# Patient Record
Sex: FEMALE | Race: WHITE | Employment: PART TIME | URBAN - METROPOLITAN AREA
[De-identification: names, ages, dates, MRNs, and addresses within clinical notes are randomized per-mention and may not be internally consistent; named-entity substitution may affect disease eponyms.]

---
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
## 2022-01-06 ENCOUNTER — OFFICE VISIT (OUTPATIENT)
Dept: PODIATRY | Age: 22
End: 2022-01-06

## 2022-01-06 VITALS — BODY MASS INDEX: 23.49 KG/M2 | TEMPERATURE: 97.7 F | WEIGHT: 150 LBS

## 2022-01-06 DIAGNOSIS — Z09 POSTOPERATIVE EXAMINATION: Primary | ICD-10-CM

## 2022-01-06 PROCEDURE — 99024 POSTOP FOLLOW-UP VISIT: CPT | Performed by: PODIATRIST

## 2022-01-06 NOTE — PROGRESS NOTES
Postop Progress Note    Subjective    Andrea Sharpe presents to the office 4 weeks  following bunion sx . Eating a regular diet without difficulty. Bowel movements are normal. Patient reports wound healing well. The patient is not having any pain. Objective    Vitals:    01/06/22 1200   Temp: 97.7 °F (36.5 °C)     General: alert, cooperative and no distress  Incision: well approximated    Assessment    Doing well postoperatively. Plan   1. Continue any current medications  2. Wound care discussed  3. Wound/Incision: well approximated  4. Disposition:   Pt is to increase activities as tolerated. Limited activities. Should not return to gym class or sports until cleared by a physician. 5. Diet: regular diet  6. Follow up: 2 weeks. Weightbearing in CAM Walker for 2-1/2 weeks and then transition into a tennis shoe. XR FOOT RIGHT (MIN 3 VIEWS)    Result Date: 12/22/2021  EXAMINATION: THREE XRAY VIEWS OF THE RIGHT FOOT 12/22/2021 11:11 am COMPARISON: Right foot x-ray 12/14/2021 HISTORY: ORDERING SYSTEM PROVIDED HISTORY: Post-op pain TECHNOLOGIST PROVIDED HISTORY: Reason for exam:->non wb FINDINGS: Exam was performed without weight-bearing. Osteotomy of the 1st proximal phalanx is unchanged with intact surgical staple. Plate and screw arthrodesis hardware at the 1st tarsometatarsal joint and medial cuneiform joint is unchanged and appears intact without evidence of loosening. There is interval removal of the bandage with evidence of residual soft tissue swelling. Surgical changes appear similar to the previous exam with intact hardware.      XR FOOT RIGHT (MIN 3 VIEWS)    Result Date: 12/14/2021  EXAMINATION: THREE XRAY VIEWS OF THE RIGHT FOOT 12/14/2021 10:26 am COMPARISON: 07/06/2021 HISTORY: ORDERING SYSTEM PROVIDED HISTORY: Post-op pain TECHNOLOGIST PROVIDED HISTORY: Reason for exam:->non wb ARTHRODESIS 12/10/2021 LAPIDUS FUSION FIRST RIGHT AKIN OSTEOTOMY FIRST RIGHT FINDINGS: Chronic hammertoe deformities again suggested at the 4th and 5th toes. Interval surgery and hardware repair of hallux valgus deformity. This includes hallux MTP joint bunionectomy and hallux TMT joint arthrodesis. Hardware appears intact without radiographic evidence of loosening. No radiographic evidence of osseous erosive change to suggest osteomyelitis. No definite acute fracture or dislocation. Interval bunion surgery without acute skeletal pathology. FLUORO FOR SURGICAL PROCEDURES    Result Date: 12/10/2021  EXAMINATION: SPOT FLUOROSCOPIC IMAGES 12/10/2021 7:14 am TECHNIQUE: Fluoroscopy was provided by the radiology department for procedure. Radiologist was not present during examination. FLUOROSCOPY DOSE AND TYPE OR TIME AND EXPOSURES: Fluoroscopy time equals 40.5 seconds. Total dose equals 0.64 mGy COMPARISON: None HISTORY: ORDERING SYSTEM PROVIDED HISTORY: right first fusion TECHNOLOGIST PROVIDED HISTORY: Reason for exam:->right first fusion Intraprocedural imaging. FINDINGS: 15 spot images of the foot were obtained. Intraprocedural fluoroscopic spot images as above. See separate procedure report for more information.      Electronically signed by Jairo Philip DPM on 1/6/2022 at 1:16 PM

## 2022-01-10 ENCOUNTER — TELEPHONE (OUTPATIENT)
Dept: PODIATRY | Age: 22
End: 2022-01-10

## 2022-01-10 NOTE — TELEPHONE ENCOUNTER
Patient called and wanted to know if she was able to return to work. Patient needs a note. Please advise.

## 2022-01-27 ENCOUNTER — OFFICE VISIT (OUTPATIENT)
Dept: PODIATRY | Age: 22
End: 2022-01-27

## 2022-01-27 VITALS — TEMPERATURE: 97.7 F | BODY MASS INDEX: 23.49 KG/M2 | WEIGHT: 150 LBS

## 2022-01-27 DIAGNOSIS — Z09 POSTOPERATIVE EXAMINATION: Primary | ICD-10-CM

## 2022-01-27 PROCEDURE — 99024 POSTOP FOLLOW-UP VISIT: CPT | Performed by: PODIATRIST

## 2022-01-27 NOTE — PROGRESS NOTES
Postop Progress Note    Allison Regalado presents to the office 7 weeks  following bunion sx . Eating a regular diet without difficulty. Bowel movements are normal. Patient reports wound healing well. The patient is not having any pain. Objective    Vitals:    01/27/22 1117   Temp: 97.7 °F (36.5 °C)     General: alert, cooperative and no distress  Incision: well approximated    Assessment    Doing well postoperatively. Plan   1. Continue any current medications  2. Wound care discussed  3. Wound/Incision: well approximated  4. Disposition:   Pt is to increase activities as tolerated. Limited activities. Should not return to gym class or sports until cleared by a physician. 5. Diet: regular diet  6. Follow up: 2 weeks. Tennis shoe  Wt bearing as tolerated      XR FOOT RIGHT (MIN 3 VIEWS)    Result Date: 12/22/2021  EXAMINATION: THREE XRAY VIEWS OF THE RIGHT FOOT 12/22/2021 11:11 am COMPARISON: Right foot x-ray 12/14/2021 HISTORY: ORDERING SYSTEM PROVIDED HISTORY: Post-op pain TECHNOLOGIST PROVIDED HISTORY: Reason for exam:->non wb FINDINGS: Exam was performed without weight-bearing. Osteotomy of the 1st proximal phalanx is unchanged with intact surgical staple. Plate and screw arthrodesis hardware at the 1st tarsometatarsal joint and medial cuneiform joint is unchanged and appears intact without evidence of loosening. There is interval removal of the bandage with evidence of residual soft tissue swelling. Surgical changes appear similar to the previous exam with intact hardware.      XR FOOT RIGHT (MIN 3 VIEWS)    Result Date: 12/14/2021  EXAMINATION: THREE XRAY VIEWS OF THE RIGHT FOOT 12/14/2021 10:26 am COMPARISON: 07/06/2021 HISTORY: ORDERING SYSTEM PROVIDED HISTORY: Post-op pain TECHNOLOGIST PROVIDED HISTORY: Reason for exam:->non wb ARTHRODESIS 12/10/2021 LAPIDUS FUSION FIRST RIGHT AKIN OSTEOTOMY FIRST RIGHT FINDINGS: Chronic hammertoe deformities again suggested at the 4th and 5th toes. Interval surgery and hardware repair of hallux valgus deformity. This includes hallux MTP joint bunionectomy and hallux TMT joint arthrodesis. Hardware appears intact without radiographic evidence of loosening. No radiographic evidence of osseous erosive change to suggest osteomyelitis. No definite acute fracture or dislocation. Interval bunion surgery without acute skeletal pathology. FLUORO FOR SURGICAL PROCEDURES    Result Date: 12/10/2021  EXAMINATION: SPOT FLUOROSCOPIC IMAGES 12/10/2021 7:14 am TECHNIQUE: Fluoroscopy was provided by the radiology department for procedure. Radiologist was not present during examination. FLUOROSCOPY DOSE AND TYPE OR TIME AND EXPOSURES: Fluoroscopy time equals 40.5 seconds. Total dose equals 0.64 mGy COMPARISON: None HISTORY: ORDERING SYSTEM PROVIDED HISTORY: right first fusion TECHNOLOGIST PROVIDED HISTORY: Reason for exam:->right first fusion Intraprocedural imaging. FINDINGS: 15 spot images of the foot were obtained. Intraprocedural fluoroscopic spot images as above. See separate procedure report for more information.      Electronically signed by Lukas Rojo DPM on 1/27/2022 at 12:14 PM

## 2022-03-03 ENCOUNTER — OFFICE VISIT (OUTPATIENT)
Dept: PODIATRY | Age: 22
End: 2022-03-03

## 2022-03-03 VITALS — TEMPERATURE: 98.2 F | BODY MASS INDEX: 23.49 KG/M2 | WEIGHT: 150 LBS

## 2022-03-03 DIAGNOSIS — Z09 POSTOPERATIVE EXAMINATION: Primary | ICD-10-CM

## 2022-03-03 PROCEDURE — 99024 POSTOP FOLLOW-UP VISIT: CPT | Performed by: PODIATRIST

## 2022-03-03 NOTE — PROGRESS NOTES
Postop Progress Note    Subjective    France Parrish presents to the office 3 months  following bunion sx . Eating a regular diet without difficulty. Bowel movements are normal. Patient reports wound healing well. The patient is not having any pain. Objective    Vitals:    03/03/22 1114   Temp: 98.2 °F (36.8 °C)     General: alert, cooperative and no distress  Incision: well approximated    Assessment    Doing well postoperatively. Plan   1. Continue any current medications  2. Wound care discussed  3. Wound/Incision: healing well  4. Disposition:   Pt is to increase activities as tolerated. Normal activities with no limitations. May return to full duty immediately with no restrictions. 5. Diet: regular diet  6. Follow up: 8 weeks. No results found.           Electronically signed by Eneida De La Rosa DPM on 3/3/2022 at 11:57 AM

## 2022-06-02 ENCOUNTER — OFFICE VISIT (OUTPATIENT)
Dept: PODIATRY | Age: 22
End: 2022-06-02
Payer: COMMERCIAL

## 2022-06-02 VITALS — WEIGHT: 150 LBS | BODY MASS INDEX: 23.49 KG/M2 | TEMPERATURE: 98.2 F

## 2022-06-02 DIAGNOSIS — Z09 POSTOPERATIVE EXAMINATION: Primary | ICD-10-CM

## 2022-06-02 PROCEDURE — 99212 OFFICE O/P EST SF 10 MIN: CPT | Performed by: PODIATRIST

## 2022-06-02 NOTE — PROGRESS NOTES
Postop Progress Note    Subjective    Raven Andrea presents to the office  months  following bunion sx . Eating a regular diet without difficulty. Bowel movements are normal. Patient reports wound healing well. The patient is not having any pain. Objective    Vitals:    06/02/22 1104   Temp: 98.2 °F (36.8 °C)     General: alert, cooperative and no distress  Incision: well approximated    Assessment    Doing well postoperatively. Plan   1. Continue any current medications  2. Wound care discussed  3. Wound/Incision: healed   4. Disposition:   Pt is to increase activities as tolerated. Normal activities with no limitations. May return to full duty immediately with no restrictions. 5. Diet: regular diet  6. Follow up:    No results found.           Electronically signed by Kimberly Krishnan DPM on 6/2/2022 at 11:40 AM

## 2022-12-01 ENCOUNTER — OFFICE VISIT (OUTPATIENT)
Dept: PODIATRY | Age: 22
End: 2022-12-01

## 2022-12-01 VITALS — WEIGHT: 150 LBS | BODY MASS INDEX: 23.49 KG/M2 | TEMPERATURE: 97.4 F

## 2022-12-01 DIAGNOSIS — Z09 POSTOPERATIVE EXAMINATION: Primary | ICD-10-CM

## 2022-12-01 NOTE — PROGRESS NOTES
Postop Progress Note    Subjective    Nishi Bussing presents to the office  1 year   following bunion sx . Eating a regular diet without difficulty. Bowel movements are normal. Patient reports wound healing well. The patient is not having any pain. Objective    Vitals:    12/01/22 0811   Temp: 97.4 °F (36.3 °C)     General: alert, cooperative and no distress  Incision: well approximated    Assessment    Doing well postoperatively. Plan   1. Continue any current medications  2. Wound care discussed  3. Wound/Incision: healed   4. Disposition: excellent   Pt is to increase activities as tolerated. Normal activities with no limitations. May return to full duty immediately with no restrictions. 5. Diet: regular diet  6.  Follow up: as needed              Electronically signed by Char Tuttle DPM on 12/1/2022 at 8:19 AM

## (undated) DEVICE — NEEDLE HYPO 22GA L1.5IN BLK POLYPR HUB S STL REG BVL STR

## (undated) DEVICE — SYSTEM TPS ORTHO

## (undated) DEVICE — INTENDED FOR TISSUE SEPARATION, AND OTHER PROCEDURES THAT REQUIRE A SHARP SURGICAL BLADE TO PUNCTURE OR CUT.: Brand: BARD-PARKER ® STAINLESS STEEL BLADES

## (undated) DEVICE — PADDING UNDERCAST W6INXL4YD WYTEX 6 PER BG

## (undated) DEVICE — CHLORAPREP 26ML ORANGE

## (undated) DEVICE — GOWN,SIRUS,FABRNF,L,20/CS: Brand: MEDLINE

## (undated) DEVICE — SET PSI

## (undated) DEVICE — INSTRUMENT CLAMP TOWEL LARGE REUSABLE

## (undated) DEVICE — COVER HNDL LT DISP

## (undated) DEVICE — GOWN,SIRUS,FABRNF,XL,20/CS: Brand: MEDLINE

## (undated) DEVICE — K WIRE FIX L6IN DIA0.062IN 1600662] MICROAIRE SURGICAL INSTRUMENTS INC]
Type: IMPLANTABLE DEVICE | Status: NON-FUNCTIONAL
Removed: 2021-12-10

## (undated) DEVICE — TOWEL,OR,DSP,ST,BLUE,STD,6/PK,12PK/CS: Brand: MEDLINE

## (undated) DEVICE — BLADE SAW SAG OSCIL LNG MED 9X31MM

## (undated) DEVICE — ELECTRODE PT RET AD L9FT HI MOIST COND ADH HYDRGEL CORDED

## (undated) DEVICE — PADDING,UNDERCAST,COTTON, 4"X4YD STERILE: Brand: MEDLINE

## (undated) DEVICE — SOLUTION IV IRRIG POUR BRL 0.9% SODIUM CHL 2F7124

## (undated) DEVICE — TUBING SUCT 12FR MAL ALUM SHFT FN CAP VENT UNIV CONN W/ OBT

## (undated) DEVICE — BANDAGE COMPR W6INXL10YD ST M E WHITE/BEIGE

## (undated) DEVICE — INSTRUMENT SYSTEM 4 BATTERY REUSABLE

## (undated) DEVICE — SOLUTION IV IRRIG WATER 1000ML POUR BRL 2F7114

## (undated) DEVICE — PACK PROCEDURE SURG GEN CUST

## (undated) DEVICE — TUBING, SUCTION, 3/16" X 12', STRAIGHT: Brand: MEDLINE

## (undated) DEVICE — BIT DRL DIA2.5MM LNG GRAD FOR ANK FRAC MGMT SYS

## (undated) DEVICE — BANDAGE,GAUZE,CONFORMING,4"X75",STRL,LF: Brand: MEDLINE

## (undated) DEVICE — BNDG,ELSTC,MATRIX,STRL,3"X5YD,LF,HOOK&LP: Brand: MEDLINE

## (undated) DEVICE — GAUZE,SPONGE,4"X4",8PLY,STRL,LF,10/TRAY: Brand: MEDLINE

## (undated) DEVICE — MARKER,SKIN,WI/RULER AND LABELS: Brand: MEDLINE

## (undated) DEVICE — GUIDEWIRE ORTH DIA0.053IN THRD W/ TRCR TIP LSR LN FOR

## (undated) DEVICE — STRIP,CLOSURE,WOUND,MEDI-STRIP,1/4X3: Brand: MEDLINE

## (undated) DEVICE — TRAY DRILL SYSTEM 4 REUSABLE

## (undated) DEVICE — SYRINGE MED 30ML STD CLR PLAS LUERLOCK TIP N CTRL DISP

## (undated) DEVICE — ZIMMER® STERILE DISPOSABLE TOURNIQUET CUFF WITH PLC, DUAL PORT, SINGLE BLADDER, 18 IN. (46 CM)

## (undated) DEVICE — LARGE TEAR RASP (12.7 X 7.0MM)

## (undated) DEVICE — SPONGE LAP W18XL18IN WHT COT 4 PLY FLD STRUNG RADPQ DISP ST

## (undated) DEVICE — DRESSING,GAUZE,PETROLATUM,CURAD,1"X8",ST: Brand: CURAD

## (undated) DEVICE — DRESSING ALG W2XL5IN ANTIMIC WND JUMPSTART

## (undated) DEVICE — INSTRUMENT CURRETTES REUSABLE

## (undated) DEVICE — GLOVE ORANGE PI 8   MSG9080

## (undated) DEVICE — DRAPE,EXTREMITY,89X128,STERILE: Brand: MEDLINE

## (undated) DEVICE — DRAPE C ARM W41XL74IN UNIV MOB W RUBBERBAND CLP

## (undated) DEVICE — BNDG,ELSTC,MATRIX,STRL,4"X5YD,LF,HOOK&LP: Brand: MEDLINE

## (undated) DEVICE — Device

## (undated) DEVICE — DRAPE CARM MINI FOR IMAG SYS INSIGHT FLROSCN

## (undated) DEVICE — DOUBLE BASIN SET: Brand: MEDLINE INDUSTRIES, INC.

## (undated) DEVICE — ANCHOR FIX DISP FOR ANK FRAC SYS BB-TAK

## (undated) DEVICE — TRAY LAMBOTS REUSABLE

## (undated) DEVICE — SET ORTHO STD STORTSTD1

## (undated) DEVICE — BANDAGE,GAUZE,CONFORMING,3"X75",STRL,LF: Brand: MEDLINE

## (undated) DEVICE — 4-PORT MANIFOLD: Brand: NEPTUNE 2